# Patient Record
Sex: FEMALE | Race: ASIAN
[De-identification: names, ages, dates, MRNs, and addresses within clinical notes are randomized per-mention and may not be internally consistent; named-entity substitution may affect disease eponyms.]

---

## 2018-03-23 ENCOUNTER — HOSPITAL ENCOUNTER (EMERGENCY)
Dept: HOSPITAL 17 - NEPD | Age: 33
Discharge: HOME | End: 2018-03-23
Payer: COMMERCIAL

## 2018-03-23 VITALS — BODY MASS INDEX: 21.64 KG/M2 | HEIGHT: 60 IN | WEIGHT: 110.23 LBS

## 2018-03-23 VITALS
DIASTOLIC BLOOD PRESSURE: 78 MMHG | OXYGEN SATURATION: 95 % | SYSTOLIC BLOOD PRESSURE: 129 MMHG | HEART RATE: 74 BPM | TEMPERATURE: 98.2 F | RESPIRATION RATE: 18 BRPM

## 2018-03-23 VITALS
TEMPERATURE: 98.5 F | OXYGEN SATURATION: 100 % | DIASTOLIC BLOOD PRESSURE: 60 MMHG | RESPIRATION RATE: 17 BRPM | HEART RATE: 63 BPM | SYSTOLIC BLOOD PRESSURE: 110 MMHG

## 2018-03-23 DIAGNOSIS — N83.201: ICD-10-CM

## 2018-03-23 DIAGNOSIS — N80.9: Primary | ICD-10-CM

## 2018-03-23 LAB
ALBUMIN SERPL-MCNC: 3.8 GM/DL (ref 3.4–5)
ALP SERPL-CCNC: 60 U/L (ref 45–117)
ALT SERPL-CCNC: 15 U/L (ref 10–53)
AST SERPL-CCNC: 11 U/L (ref 15–37)
BACTERIA #/AREA URNS HPF: (no result) /HPF
BASOPHILS # BLD AUTO: 0.1 TH/MM3 (ref 0–0.2)
BASOPHILS NFR BLD: 1 % (ref 0–2)
BILIRUB SERPL-MCNC: 0.5 MG/DL (ref 0.2–1)
BUN SERPL-MCNC: 10 MG/DL (ref 7–18)
CALCIUM SERPL-MCNC: 8.7 MG/DL (ref 8.5–10.1)
CHLORIDE SERPL-SCNC: 105 MEQ/L (ref 98–107)
COLOR UR: (no result)
CREAT SERPL-MCNC: 0.9 MG/DL (ref 0.5–1)
EOSINOPHIL # BLD: 0.2 TH/MM3 (ref 0–0.4)
EOSINOPHIL NFR BLD: 3 % (ref 0–4)
ERYTHROCYTE [DISTWIDTH] IN BLOOD BY AUTOMATED COUNT: 15.6 % (ref 11.6–17.2)
GFR SERPLBLD BASED ON 1.73 SQ M-ARVRAT: 74 ML/MIN (ref 89–?)
GLUCOSE SERPL-MCNC: 81 MG/DL (ref 74–106)
GLUCOSE UR STRIP-MCNC: (no result) MG/DL
HCO3 BLD-SCNC: 25.6 MEQ/L (ref 21–32)
HCT VFR BLD CALC: 36.3 % (ref 35–46)
HGB BLD-MCNC: 11.8 GM/DL (ref 11.6–15.3)
HGB UR QL STRIP: (no result)
KETONES UR STRIP-MCNC: (no result) MG/DL
LYMPHOCYTES # BLD AUTO: 2.1 TH/MM3 (ref 1–4.8)
LYMPHOCYTES NFR BLD AUTO: 29.4 % (ref 9–44)
MCH RBC QN AUTO: 24.9 PG (ref 27–34)
MCHC RBC AUTO-ENTMCNC: 32.5 % (ref 32–36)
MCV RBC AUTO: 76.5 FL (ref 80–100)
MONOCYTE #: 0.6 TH/MM3 (ref 0–0.9)
MONOCYTES NFR BLD: 9 % (ref 0–8)
NEUTROPHILS # BLD AUTO: 4.1 TH/MM3 (ref 1.8–7.7)
NEUTROPHILS NFR BLD AUTO: 57.6 % (ref 16–70)
NITRITE UR QL STRIP: (no result)
PLATELET # BLD: 290 TH/MM3 (ref 150–450)
PMV BLD AUTO: 8.6 FL (ref 7–11)
PROT SERPL-MCNC: 7.8 GM/DL (ref 6.4–8.2)
RBC # BLD AUTO: 4.75 MIL/MM3 (ref 4–5.3)
SODIUM SERPL-SCNC: 138 MEQ/L (ref 136–145)
SP GR UR STRIP: 1.01 (ref 1–1.03)
SQUAMOUS #/AREA URNS HPF: 2 /HPF (ref 0–5)
URINE LEUKOCYTE ESTERASE: (no result)
WBC # BLD AUTO: 7.1 TH/MM3 (ref 4–11)

## 2018-03-23 PROCEDURE — 80053 COMPREHEN METABOLIC PANEL: CPT

## 2018-03-23 PROCEDURE — 99284 EMERGENCY DEPT VISIT MOD MDM: CPT

## 2018-03-23 PROCEDURE — 87591 N.GONORRHOEAE DNA AMP PROB: CPT

## 2018-03-23 PROCEDURE — 87210 SMEAR WET MOUNT SALINE/INK: CPT

## 2018-03-23 PROCEDURE — 76856 US EXAM PELVIC COMPLETE: CPT

## 2018-03-23 PROCEDURE — 85025 COMPLETE CBC W/AUTO DIFF WBC: CPT

## 2018-03-23 PROCEDURE — 81001 URINALYSIS AUTO W/SCOPE: CPT

## 2018-03-23 PROCEDURE — 84703 CHORIONIC GONADOTROPIN ASSAY: CPT

## 2018-03-23 PROCEDURE — 87491 CHLMYD TRACH DNA AMP PROBE: CPT

## 2018-03-23 NOTE — PD
HPI


Chief Complaint:  Gyn Problem/Complaint


Time Seen by Provider:  12:44


Travel History


International Travel<30 days:  No


Contact w/Intl Traveler<30days:  No


Traveled to known affect area:  No





History of Present Illness


HPI


32-year-old female presents with history of endometriosis requiring surgery in 

2014 with ongoing left lower quadrant pain with radiation into the left flank, 

as well as brown vaginal discharge for several weeks.  Patient denies fever, 

chills, urinary symptoms, or changes in her bowels.  Patient denies changes in 

sexual partners.  She states this pain has been ongoing for some time.  She 

states the discharge is been going on for some time.  She states her last 

menstrual period ended on March 9.  She states she is not pregnant.  Pain is a 

7 out of 10.  She has no known drug allergies.





Previously seen by Dr. Hernandez Salazar, GYN, with similar complaints in 

February 2017.





[facility]


Past Medical History


Pregnant?:  Not Pregnant


LMP:  3/9/18





Social History


Alcohol Use:  No


Tobacco Use:  No


Substance Use:  No





Allergies-Medications


(Allergen,Severity, Reaction):  


Coded Allergies:  


     No Known Allergies (Unverified  Adverse Reaction, Unknown, 3/23/18)


Reported Meds & Prescriptions





Reported Meds & Active Scripts


Active


No Active Prescriptions or Reported Medications    








Review of Systems


ROS Limitations:  Poor Historian


Except as stated in HPI:  all other systems reviewed are Neg


General / Constitutional:  No: Fever


Eyes:  No: Visual changes


HENT:  No: Headaches


Cardiovascular:  No: Chest Pain or Discomfort


Respiratory:  No: Shortness of Breath


Gastrointestinal:  No: Abdominal Pain


Genitourinary:  Positive: Pelvic Pain, Flank Pain, Discharge, No: Urgency, 

Frequency, Dysuria, Vaginal Bleeding


Musculoskeletal:  No: Pain


Skin:  No Rash


Neurologic:  No: Weakness


Psychiatric:  No: Depression


Endocrine:  No: Polydipsia


Hematologic/Lymphatic:  No: Easy Bruising





Physical Exam


Narrative


GENERAL: Patient ambulatory to the room without difficulty.  She appears in no 

acute distress.


SKIN: Warm and dry.  Normal color.  Normal turgor.


HEAD: Atraumatic. Normocephalic. 


EYES: Pupils equal and round. No scleral icterus. No injection or drainage. 


ENT: No nasal bleeding or discharge.  Mucous membranes pink and moist.


NECK: Trachea midline. No JVD. 


CARDIOVASCULAR: Regular rate and rhythm.  


RESPIRATORY: No accessory muscle use. Clear to auscultation. Breath sounds 

equal bilaterally. 


GASTROINTESTINAL: Abdomen soft, non-tender, nondistended. Hepatic and splenic 

margins not palpable. 


PELVIC: Pelvic exam performed with nursing chaperone shows normal-appearing 

vagina without rash or lesion.  There is a small amount of yellowish discharge 

noted in the vault.  Cervix appeared normal without friability or lesion.  

Patient discomfort with palpation of the left suprapubic area, without palpable 

mass.  Wet prep was sent to the lab.


MUSCULOSKELETAL: Extremities without clubbing, cyanosis, or edema. No obvious 

deformities. 


NEUROLOGICAL: Awake and alert. No obvious cranial nerve deficits.  Motor 

grossly within normal limits. Five out of 5 muscle strength in the arms and 

legs.  Normal speech.


PSYCHIATRIC: Appropriate mood and affect; insight and judgment normal.





Data


Data


Last Documented VS





Vital Signs








  Date Time  Temp Pulse Resp B/P (MAP) Pulse Ox O2 Delivery O2 Flow Rate FiO2


 


3/23/18 13:13 98.5 63 17 110/60 (77) 100 Room Air  








Orders





 Orders


Complete Blood Count With Diff (3/23/18 12:47)


Comprehensive Metabolic Panel (3/23/18 12:47)


Gc And Chlamydia Pcr (3/23/18 12:47)


Wet Prep Profile (3/23/18 12:47)


Urinalysis - C+S If Indicated (3/23/18 12:47)


Ed Urine Pregnancytest Poc (3/23/18 13:08)


Us Pelvis Comp Gyn/Non-Preg (3/23/18 13:48)





Labs





Laboratory Tests








Test


  3/23/18


13:15 3/23/18


13:30 3/23/18


13:45


 


White Blood Count 7.1 TH/MM3   


 


Red Blood Count 4.75 MIL/MM3   


 


Hemoglobin 11.8 GM/DL   


 


Hematocrit 36.3 %   


 


Mean Corpuscular Volume 76.5 FL   


 


Mean Corpuscular Hemoglobin 24.9 PG   


 


Mean Corpuscular Hemoglobin


Concent 32.5 % 


  


  


 


 


Red Cell Distribution Width 15.6 %   


 


Platelet Count 290 TH/MM3   


 


Mean Platelet Volume 8.6 FL   


 


Neutrophils (%) (Auto) 57.6 %   


 


Lymphocytes (%) (Auto) 29.4 %   


 


Monocytes (%) (Auto) 9.0 %   


 


Eosinophils (%) (Auto) 3.0 %   


 


Basophils (%) (Auto) 1.0 %   


 


Neutrophils # (Auto) 4.1 TH/MM3   


 


Lymphocytes # (Auto) 2.1 TH/MM3   


 


Monocytes # (Auto) 0.6 TH/MM3   


 


Eosinophils # (Auto) 0.2 TH/MM3   


 


Basophils # (Auto) 0.1 TH/MM3   


 


CBC Comment DIFF FINAL   


 


Differential Comment    


 


Blood Urea Nitrogen 10 MG/DL   


 


Creatinine 0.90 MG/DL   


 


Random Glucose 81 MG/DL   


 


Total Protein 7.8 GM/DL   


 


Albumin 3.8 GM/DL   


 


Calcium Level 8.7 MG/DL   


 


Alkaline Phosphatase 60 U/L   


 


Aspartate Amino Transf


(AST/SGOT) 11 U/L 


  


  


 


 


Alanine Aminotransferase


(ALT/SGPT) 15 U/L 


  


  


 


 


Total Bilirubin 0.5 MG/DL   


 


Sodium Level 138 MEQ/L   


 


Potassium Level 3.4 MEQ/L   


 


Chloride Level 105 MEQ/L   


 


Carbon Dioxide Level 25.6 MEQ/L   


 


Anion Gap 7 MEQ/L   


 


Estimat Glomerular Filtration


Rate 74 ML/MIN 


  


  


 


 


Urine Color  LIGHT-YELLOW  


 


Urine Turbidity  CLEAR  


 


Urine pH  5.5  


 


Urine Specific Gravity  1.009  


 


Urine Protein  NEG mg/dL  


 


Urine Glucose (UA)  NEG mg/dL  


 


Urine Ketones  NEG mg/dL  


 


Urine Occult Blood  SMALL  


 


Urine Nitrite  NEG  


 


Urine Bilirubin  NEG  


 


Urine Urobilinogen


  


  LESS THAN 2.0


MG/DL 


 


 


Urine Leukocyte Esterase  LARGE  


 


Urine RBC  3 /hpf  


 


Urine WBC  6 /hpf  


 


Urine Squamous Epithelial


Cells 


  2 /hpf 


  


 


 


Urine Bacteria  RARE /hpf  


 


Microscopic Urinalysis Comment


  


  CULT NOT


INDICATED 


 


 


Clue Cells (Wet Prep)   NONE SEEN 


 


Vaginal Trichomonas (Wet Prep)   NONE SEEN 


 


Vaginal Yeast (Wet Prep)   NONE SEEN 











MDM


Medical Decision Making


Medical Screen Exam Complete:  Yes


Emergency Medical Condition:  Yes


Medical Record Reviewed:  Yes


Differential Diagnosis


Left lower abdominal pain.  Ovarian cyst.  Endometriosis.  STD.  Pregnancy.


Narrative Course


Patient appears medically stable.


Labs ordered including urinalysis, urine pregnancy, wet prep, and urine GC 

chlamydia.


Pelvic is performed wet prep sent to the lab.


Pelvic ultrasound is ordered to evaluate endometriosis.


Urine pregnancy is negative.


Urinalysis is normal.


Wet prep is negative


GC and Chlamydia are pending.


Pelvic ultrasound showed:





Right ovarian cyst


Presumed 3 cm left endometrioma.





Based on my history and physical, as well as the current lab results, I do not 

feel an infectious process is causing the patient's complaints


Patient will be treated for her endometriosis with ibuprofen 800 mg 3 times 

daily with food.


Recommend the patient follow-up with the [facility]'s center for further 

evaluation and treatment as needed.


GC and Chlamydia are pending.





Patient can return with worsening symptoms as needed.





Diagnosis





 Primary Impression:  


 Endometriosis


 Additional Impression:  


 Ovarian cyst


 Qualified Codes:  N83.201 - Unspecified ovarian cyst, right side


Referrals:  


[facility]


Patient Instructions:  Endometriosis (ED), General Instructions, Ovarian Cyst (

ED)





***Additional Instructions:  


Urine pregnancy is negative.


Urinalysis is normal.


Wet prep is negative


GC and Chlamydia are pending.


Pelvic ultrasound showed:





Right ovarian cyst


Presumed 3 cm left endometrioma.





Based on my history and physical, as well as the current lab results, I do not 

feel an infectious process is causing the patient's complaints


Patient will be treated for her endometriosis with ibuprofen 800 mg 3 times 

daily with food.


Recommend the patient follow-up with the [place] [facility] [place] for further 

evaluation and treatment as needed.


GC and Chlamydia are pending.





Patient can return with worsening symptoms as needed.


***Med/Other Pt SpecificInfo:  Prescription(s) given


Scripts


Ibuprofen (Ibuprofen) 800 Mg Tab


800 MG PO Q8H Y for Pain/Inflammation, #60 TAB 0 Refills


   Prov: Teofilo Enriquez MD         3/23/18


Disposition:  01 DISCHARGE HOME


Condition:  Stable











Luis Daniel Rivas Mar 23, 2018 12:47

## 2018-03-23 NOTE — RADRPT
EXAM DATE/TIME:  03/23/2018 14:55 

 

[facility] COMPARISON:     

No previous studies available for comparison.

        

 

 

INDICATIONS :     

Left lower quadrant abdominal pain with radiation to left flank.

                     

 

MEDICAL HISTORY :           

Endometriosis.

 

SURGICAL HISTORY :          

Surgery for endometriosis.

 

ENCOUNTER:     

Initial

 

ACUITY:     

1 day

 

PAIN SCORE:     

7/10

 

LOCATION:     

Left lower quadrant abdomen.

                     

MEASUREMENTS:     

 

UTERUS:                                  

9.7 x 5.4 x 6.5 cm 

 

ENDOMETRIAL STRIPE:       

Non visualized

 

RIGHT OVARY:                      

3.4 x 2.4 x 3.0 cm 

 

LEFT OVARY:                         

3.6 x 1.9 x 2.6 cm 

 

FINDINGS:     

 

UTERUS:     

The myometrium has homogeneous echotexture without mass.  

 

RIGHT OVARY:     

2.6 cm cystic mass.

 

LEFT OVARY:     

Complex 3 cm area left ovary that could be endometrioma as questioned.

 

MISCELLANEOUS:     

No free fluid.  

 

CONCLUSION:     

Right ovarian cyst

Presumed  3 cm left endometrioma.

 

 

 

 Jamie Chow MD FACR on March 23, 2018 at 15:38           

Board Certified Radiologist.

 This report was verified electronically.

## 2018-09-05 ENCOUNTER — APPOINTMENT (RX ONLY)
Dept: URBAN - METROPOLITAN AREA CLINIC 61 | Facility: CLINIC | Age: 33
Setting detail: DERMATOLOGY
End: 2018-09-05

## 2018-09-05 DIAGNOSIS — L81.4 OTHER MELANIN HYPERPIGMENTATION: ICD-10-CM

## 2018-09-05 DIAGNOSIS — L70.0 ACNE VULGARIS: ICD-10-CM | Status: INADEQUATELY CONTROLLED

## 2018-09-05 DIAGNOSIS — Q82.5 CONGENITAL NON-NEOPLASTIC NEVUS: ICD-10-CM

## 2018-09-05 PROBLEM — L81.9 DISORDER OF PIGMENTATION, UNSPECIFIED: Status: ACTIVE | Noted: 2018-09-05

## 2018-09-05 PROCEDURE — ? EDUCATIONAL RESOURCES PROVIDED

## 2018-09-05 PROCEDURE — ? PRESCRIPTION

## 2018-09-05 PROCEDURE — ? PRESCRIPTION MEDICATION MANAGEMENT

## 2018-09-05 PROCEDURE — ? COUNSELING

## 2018-09-05 PROCEDURE — 99202 OFFICE O/P NEW SF 15 MIN: CPT

## 2018-09-05 RX ORDER — AZELAIC ACID 0.15 G/G
GEL TOPICAL
Qty: 1 | Refills: 3 | Status: ERX | COMMUNITY
Start: 2018-09-05

## 2018-09-05 RX ORDER — ERYTHROMYCIN 500 MG/1
TABLET, FILM COATED ORAL
Qty: 60 | Refills: 3 | Status: ERX | COMMUNITY
Start: 2018-09-05

## 2018-09-05 RX ADMIN — AZELAIC ACID: 0.15 GEL TOPICAL at 14:03

## 2018-09-05 RX ADMIN — ERYTHROMYCIN: 500 TABLET, FILM COATED ORAL at 14:01

## 2018-09-05 ASSESSMENT — LOCATION SIMPLE DESCRIPTION DERM
LOCATION SIMPLE: INFERIOR FOREHEAD
LOCATION SIMPLE: CHIN
LOCATION SIMPLE: RIGHT CHEEK
LOCATION SIMPLE: CHEST
LOCATION SIMPLE: CHEST
LOCATION SIMPLE: RIGHT UPPER BACK
LOCATION SIMPLE: TRAPEZIAL NECK
LOCATION SIMPLE: LEFT CHEEK
LOCATION SIMPLE: LEFT CHEEK
LOCATION SIMPLE: LEFT SHOULDER
LOCATION SIMPLE: UPPER BACK

## 2018-09-05 ASSESSMENT — LOCATION DETAILED DESCRIPTION DERM
LOCATION DETAILED: LEFT LATERAL SHOULDER
LOCATION DETAILED: RIGHT INFERIOR MEDIAL MALAR CHEEK
LOCATION DETAILED: RIGHT SUPERIOR MEDIAL UPPER BACK
LOCATION DETAILED: LEFT CHIN
LOCATION DETAILED: LEFT INFERIOR CENTRAL MALAR CHEEK
LOCATION DETAILED: LEFT INFERIOR CENTRAL MALAR CHEEK
LOCATION DETAILED: MIDDLE STERNUM
LOCATION DETAILED: INFERIOR MID FOREHEAD
LOCATION DETAILED: MID TRAPEZIAL NECK
LOCATION DETAILED: SUPERIOR THORACIC SPINE
LOCATION DETAILED: RIGHT LATERAL SUPERIOR CHEST
LOCATION DETAILED: LEFT LATERAL SUPERIOR CHEST

## 2018-09-05 ASSESSMENT — LOCATION ZONE DERM
LOCATION ZONE: FACE
LOCATION ZONE: FACE
LOCATION ZONE: TRUNK
LOCATION ZONE: TRUNK
LOCATION ZONE: NECK
LOCATION ZONE: ARM

## 2018-09-05 ASSESSMENT — SEVERITY ASSESSMENT: SEVERITY: MILD

## 2018-09-05 ASSESSMENT — SEVERITY ASSESSMENT OVERALL AMONG ALL PATIENTS
IN YOUR EXPERIENCE, AMONG ALL PATIENTS YOU HAVE SEEN WITH THIS CONDITION, HOW SEVERE IS THIS PATIENT'S CONDITION?: INFLAMMATORY LESIONS MORE APPARENT; MANY COMEDONES AND PAPULES/PUSTULES, +/- FEW NODULOCYSTIC LESIONS

## 2018-09-05 ASSESSMENT — BSA RASH: BSA RASH: 2

## 2018-09-05 NOTE — PROCEDURE: PRESCRIPTION MEDICATION MANAGEMENT
Otc Regimen: Pure glycolic acid. Lactic acid.
Plan: Use non-comedogenic skin/hair care products  avoid products unsafe in pregnancy\\nAdd UVA filters to truck window glass
Detail Level: Zone
Initiate Treatment: erythromycin 500 bid;  Finacea  bid

## 2018-09-05 NOTE — PROCEDURE: COUNSELING
Isotretinoin Pregnancy And Lactation Text: This medication is Pregnancy Category X and is considered extremely dangerous during pregnancy. It is unknown if it is excreted in breast milk.
Spironolactone Counseling: Patient advised regarding risks of diarrhea, abdominal pain, hyperkalemia, birth defects (for female patients), liver toxicity and renal toxicity. The patient may need blood work to monitor liver and kidney function and potassium levels while on therapy. The patient verbalized understanding of the proper use and possible adverse effects of spironolactone.  All of the patient's questions and concerns were addressed.
Erythromycin Counseling:  I discussed with the patient the risks of erythromycin including but not limited to GI upset, allergic reaction, drug rash, diarrhea, increase in liver enzymes, and yeast infections.
Include Pregnancy/Lactation Warning?: No
Doxycycline Pregnancy And Lactation Text: This medication is Pregnancy Category D and not consider safe during pregnancy. It is also excreted in breast milk but is considered safe for shorter treatment courses.
Azithromycin Counseling:  I discussed with the patient the risks of azithromycin including but not limited to GI upset, allergic reaction, drug rash, diarrhea, and yeast infections.
Dapsone Pregnancy And Lactation Text: This medication is Pregnancy Category C and is not considered safe during pregnancy or breast feeding.
Topical Retinoid Pregnancy And Lactation Text: This medication is Pregnancy Category C. It is unknown if this medication is excreted in breast milk.
Erythromycin Pregnancy And Lactation Text: This medication is Pregnancy Category B and is considered safe during pregnancy. It is also excreted in breast milk.
Topical Clindamycin Counseling: Patient counseled that this medication may cause skin irritation or allergic reactions.  In the event of skin irritation, the patient was advised to reduce the amount of the drug applied or use it less frequently.   The patient verbalized understanding of the proper use and possible adverse effects of clindamycin.  All of the patient's questions and concerns were addressed.
Topical Clindamycin Pregnancy And Lactation Text: This medication is Pregnancy Category B and is considered safe during pregnancy. It is unknown if it is excreted in breast milk.
Azithromycin Pregnancy And Lactation Text: This medication is considered safe during pregnancy and is also secreted in breast milk.
Detail Level: Zone
Bactrim Counseling:  I discussed with the patient the risks of sulfa antibiotics including but not limited to GI upset, allergic reaction, drug rash, diarrhea, dizziness, photosensitivity, and yeast infections.  Rarely, more serious reactions can occur including but not limited to aplastic anemia, agranulocytosis, methemoglobinemia, blood dyscrasias, liver or kidney failure, lung infiltrates or desquamative/blistering drug rashes.
Bactrim Pregnancy And Lactation Text: This medication is Pregnancy Category D and is known to cause fetal risk.  It is also excreted in breast milk.
Isotretinoin Counseling: Patient should get monthly blood tests, not donate blood, not drive at night if vision affected, not share medication, and not undergo elective surgery for 6 months after tx completed. Side effects reviewed, pt to contact office should one occur.
Minocycline Counseling: Patient advised regarding possible photosensitivity and discoloration of the teeth, skin, lips, tongue and gums.  Patient instructed to avoid sunlight, if possible.  When exposed to sunlight, patients should wear protective clothing, sunglasses, and sunscreen.  The patient was instructed to call the office immediately if the following severe adverse effects occur:  hearing changes, easy bruising/bleeding, severe headache, or vision changes.  The patient verbalized understanding of the proper use and possible adverse effects of minocycline.  All of the patient's questions and concerns were addressed.
Tazorac Pregnancy And Lactation Text: This medication is not safe during pregnancy. It is unknown if this medication is excreted in breast milk.
High Dose Vitamin A Counseling: Side effects reviewed, pt to contact office should one occur.
Birth Control Pills Pregnancy And Lactation Text: This medication should be avoided if pregnant and for the first 30 days post-partum.
Birth Control Pills Counseling: Birth Control Pill Counseling: I discussed with the patient the potential side effects of OCPs including but not limited to increased risk of stroke, heart attack, thrombophlebitis, deep venous thrombosis, hepatic adenomas, breast changes, GI upset, headaches, and depression.  The patient verbalized understanding of the proper use and possible adverse effects of OCPs. All of the patient's questions and concerns were addressed.
Spironolactone Pregnancy And Lactation Text: This medication can cause feminization of the male fetus and should be avoided during pregnancy. The active metabolite is also found in breast milk.
Tetracycline Counseling: Patient counseled regarding possible photosensitivity and increased risk for sunburn.  Patient instructed to avoid sunlight, if possible.  When exposed to sunlight, patients should wear protective clothing, sunglasses, and sunscreen.  The patient was instructed to call the office immediately if the following severe adverse effects occur:  hearing changes, easy bruising/bleeding, severe headache, or vision changes.  The patient verbalized understanding of the proper use and possible adverse effects of tetracycline.  All of the patient's questions and concerns were addressed. Patient understands to avoid pregnancy while on therapy due to potential birth defects.
Tetracycline Pregnancy And Lactation Text: This medication is Pregnancy Category D and not consider safe during pregnancy. It is also excreted in breast milk.
Patient Specific Counseling (Will Not Stick From Patient To Patient): Patient understands limitations of treatments due to her attempting to get pregnancy\\nPatient understands that endometriosis contributes to acne
Topical Sulfur Applications Pregnancy And Lactation Text: This medication is Pregnancy Category C and has an unknown safety profile during pregnancy. It is unknown if this topical medication is excreted in breast milk.
High Dose Vitamin A Pregnancy And Lactation Text: High dose vitamin A therapy is contraindicated during pregnancy and breast feeding.
Topical Retinoid counseling:  Patient advised to apply a pea-sized amount only at bedtime and wait 30 minutes after washing their face before applying.  If too drying, patient may add a non-comedogenic moisturizer. The patient verbalized understanding of the proper use and possible adverse effects of retinoids.  All of the patient's questions and concerns were addressed.
Doxycycline Counseling:  Patient counseled regarding possible photosensitivity and increased risk for sunburn.  Patient instructed to avoid sunlight, if possible.  When exposed to sunlight, patients should wear protective clothing, sunglasses, and sunscreen.  The patient was instructed to call the office immediately if the following severe adverse effects occur:  hearing changes, easy bruising/bleeding, severe headache, or vision changes.  The patient verbalized understanding of the proper use and possible adverse effects of doxycycline.  All of the patient's questions and concerns were addressed.
Tazorac Counseling:  Patient advised that medication is irritating and drying.  Patient may need to apply sparingly and wash off after an hour before eventually leaving it on overnight.  The patient verbalized understanding of the proper use and possible adverse effects of tazorac.  All of the patient's questions and concerns were addressed.
Topical Sulfur Applications Counseling: Topical Sulfur Counseling: Patient counseled that this medication may cause skin irritation or allergic reactions.  In the event of skin irritation, the patient was advised to reduce the amount of the drug applied or use it less frequently.   The patient verbalized understanding of the proper use and possible adverse effects of topical sulfur application.  All of the patient's questions and concerns were addressed.
Benzoyl Peroxide Pregnancy And Lactation Text: This medication is Pregnancy Category C. It is unknown if benzoyl peroxide is excreted in breast milk.
Dapsone Counseling: I discussed with the patient the risks of dapsone including but not limited to hemolytic anemia, agranulocytosis, rashes, methemoglobinemia, kidney failure, peripheral neuropathy, headaches, GI upset, and liver toxicity.  Patients who start dapsone require monitoring including baseline LFTs and weekly CBCs for the first month, then every month thereafter.  The patient verbalized understanding of the proper use and possible adverse effects of dapsone.  All of the patient's questions and concerns were addressed.
Benzoyl Peroxide Counseling: Patient counseled that medicine may cause skin irritation and bleach clothing.  In the event of skin irritation, the patient was advised to reduce the amount of the drug applied or use it less frequently.   The patient verbalized understanding of the proper use and possible adverse effects of benzoyl peroxide.  All of the patient's questions and concerns were addressed.
Detail Level: Detailed
Patient Specific Counseling (Will Not Stick From Patient To Patient): Patient to work with  re treatment safe to use in pregnancy

## 2018-09-07 ENCOUNTER — APPOINTMENT (RX ONLY)
Dept: URBAN - METROPOLITAN AREA CLINIC 61 | Facility: CLINIC | Age: 33
Setting detail: DERMATOLOGY
End: 2018-09-07

## 2018-09-07 DIAGNOSIS — L70.0 ACNE VULGARIS: ICD-10-CM

## 2018-09-07 PROCEDURE — ? PRESCRIPTION

## 2018-09-07 RX ORDER — AZELAIC ACID 0.2 G/G
CREAM CUTANEOUS
Qty: 1 | Refills: 2 | Status: ERX | COMMUNITY
Start: 2018-09-07

## 2018-09-07 RX ADMIN — AZELAIC ACID: 0.2 CREAM CUTANEOUS at 18:58

## 2018-09-07 ASSESSMENT — LOCATION DETAILED DESCRIPTION DERM
LOCATION DETAILED: LEFT INFERIOR CENTRAL MALAR CHEEK
LOCATION DETAILED: RIGHT INFERIOR CENTRAL MALAR CHEEK
LOCATION DETAILED: RIGHT INFERIOR CENTRAL MALAR CHEEK

## 2018-09-07 ASSESSMENT — LOCATION SIMPLE DESCRIPTION DERM
LOCATION SIMPLE: LEFT CHEEK
LOCATION SIMPLE: RIGHT CHEEK
LOCATION SIMPLE: RIGHT CHEEK

## 2018-09-07 ASSESSMENT — LOCATION ZONE DERM
LOCATION ZONE: FACE
LOCATION ZONE: FACE